# Patient Record
Sex: FEMALE | Race: WHITE | Employment: OTHER
[De-identification: names, ages, dates, MRNs, and addresses within clinical notes are randomized per-mention and may not be internally consistent; named-entity substitution may affect disease eponyms.]

---

## 2017-06-24 ENCOUNTER — HEALTH MAINTENANCE LETTER (OUTPATIENT)
Age: 72
End: 2017-06-24

## 2018-01-20 ENCOUNTER — HEALTH MAINTENANCE LETTER (OUTPATIENT)
Age: 73
End: 2018-01-20

## 2018-06-30 ENCOUNTER — HEALTH MAINTENANCE LETTER (OUTPATIENT)
Age: 73
End: 2018-06-30

## 2019-10-03 ENCOUNTER — HEALTH MAINTENANCE LETTER (OUTPATIENT)
Age: 74
End: 2019-10-03

## 2019-12-16 ENCOUNTER — HEALTH MAINTENANCE LETTER (OUTPATIENT)
Age: 74
End: 2019-12-16

## 2021-01-15 ENCOUNTER — HEALTH MAINTENANCE LETTER (OUTPATIENT)
Age: 76
End: 2021-01-15

## 2021-09-05 ENCOUNTER — HEALTH MAINTENANCE LETTER (OUTPATIENT)
Age: 76
End: 2021-09-05

## 2022-01-20 ENCOUNTER — OFFICE VISIT (OUTPATIENT)
Dept: FAMILY MEDICINE | Facility: CLINIC | Age: 77
End: 2022-01-20
Payer: COMMERCIAL

## 2022-01-20 VITALS — SYSTOLIC BLOOD PRESSURE: 106 MMHG | DIASTOLIC BLOOD PRESSURE: 60 MMHG

## 2022-01-20 DIAGNOSIS — L57.8 SOLAR ELASTOSIS: Primary | ICD-10-CM

## 2022-01-20 DIAGNOSIS — Z85.828 HISTORY OF NONMELANOMA SKIN CANCER: ICD-10-CM

## 2022-01-20 DIAGNOSIS — Z80.8 FAMILY HISTORY OF SKIN CANCER: ICD-10-CM

## 2022-01-20 DIAGNOSIS — L57.0 ACTINIC KERATOSES: ICD-10-CM

## 2022-01-20 DIAGNOSIS — D22.9 MULTIPLE BENIGN NEVI: ICD-10-CM

## 2022-01-20 DIAGNOSIS — L81.4 LENTIGINES: ICD-10-CM

## 2022-01-20 DIAGNOSIS — L82.1 SEBORRHEIC KERATOSES: ICD-10-CM

## 2022-01-20 DIAGNOSIS — D18.01 CHERRY ANGIOMA: ICD-10-CM

## 2022-01-20 PROCEDURE — 17004 DESTROY PREMAL LESIONS 15/>: CPT | Performed by: PHYSICIAN ASSISTANT

## 2022-01-20 PROCEDURE — 99203 OFFICE O/P NEW LOW 30 MIN: CPT | Mod: 25 | Performed by: PHYSICIAN ASSISTANT

## 2022-01-20 RX ORDER — PANTOPRAZOLE SODIUM 40 MG/1
40 TABLET, DELAYED RELEASE ORAL
COMMUNITY
Start: 2022-01-11

## 2022-01-20 RX ORDER — ATORVASTATIN CALCIUM 80 MG/1
80 TABLET, FILM COATED ORAL
COMMUNITY
Start: 2022-01-18

## 2022-01-20 RX ORDER — AMOXICILLIN 500 MG/1
TABLET, FILM COATED ORAL
COMMUNITY
Start: 2021-11-24

## 2022-01-20 RX ORDER — NITROGLYCERIN 0.4 MG/1
0.4 TABLET SUBLINGUAL
COMMUNITY
Start: 2022-01-14

## 2022-01-20 RX ORDER — ACETAMINOPHEN 500 MG
1000 TABLET ORAL
COMMUNITY
Start: 2021-10-12

## 2022-01-20 NOTE — PROGRESS NOTES
[facility] Dermatology Note  Encounter Date: Jan 20, 2022  Office Visit   ____________________________________________    Assessment & Plan:    1. Actinic keratoses x 15 and solar elastosis  Due to chronic sun exposure.  - LN2 for 5 seconds x 2. Discussed AE include hypopigmentation (white spot) and recurrence. Follow up in 2-3 months to recheck lesions. There is a risk of AKs developing into a SCC.   Treatment options include LN2 vs PDT vs Efudex. Pt elected LN2    2. Lentigines, multiple benign nevi, cherry angiomas, and seborrheic keratoses  Treatment of these lesions would be purely cosmetic and not medically necessary.  Lesion may recur and/or may not completely resolve. May need additional treatment.  Different removal options including excision, cryotherapy, cautery and /or laser.      3. History of NMSC - SCC, right shoulder 2014, family history of skin cancer  No evidence of recurrence  Signs and Symptoms of non-melanoma skin cancer and ABCDEs of melanoma reviewed with patient. Patient encouraged to perform monthly self skin exams and educated on how to perform them. UV precautions reviewed with patient. Patient was asked about new or changing moles/lesions on body.   Wear a sunscreen with at least SPF 30 on your face, ears, neck and V of the chest daily. Wear sunscreen on other areas of the body if those areas are exposed to the sun throughout the day. Sunscreens can contain physical and/or chemical blockers. Physical blockers are less likely to clog pores, these include zinc oxide and titanium dioxide. Reapply every two hour and after swimming. Sunscreen examples include Neutrogena, CeraVe, Blue Lizard, Elta MD and many others.        Follow-up: 1 year(s) in-person, or earlier for new or changing lesions    Labs and office visit notes reviewed:  1/18/22 hospital visit    Provider Disclosure:   The documentation recorded by the scribe accurately reflects the services I personally  performed and the decisions made by me.    Henrietta Nova, MS, NADIA      Scribe Disclosure:  I, Cristal Michael, am serving as a scribe to document services personally performed by Henrietta Nova PA-C based on data collection and the provider's statements to me.   ____________________________________________________    HPI:  Ms. Helga Guevara is a(n) 76 year old female who presents today as a new patient for FBSE, making note of some scaly spots on the face. She states these have been present for several weeks to months. Patient reports the following symptoms: none. Patient reports the following previous treatments: none. Patient reports the following modifying factors: none. Associated symptoms: none. Patient has no other skin complaints today. Remainder of the HPI, Meds, PMH, Allergies, FH, and SH was reviewed in chart.       Pertinent findings: personal history of NMSC - SCC right shoulder 2014; family history of skin cancer - grandmother    Medications:  Current Outpatient Medications   Medication     acetaminophen (TYLENOL) 500 MG tablet     amoxicillin (AMOXIL) 500 MG tablet     ASPIRIN 81 MG OR TABS     atorvastatin (LIPITOR) 80 MG tablet     FISH  MG OR CAPS     MULTIVITAMINS OR TABS     nitroGLYcerin (NITROSTAT) 0.4 MG sublingual tablet     pantoprazole (PROTONIX) 40 MG EC tablet     ALBUTEROL SULFATE 108 MCG/ACT IN AERS     NASONEX 50 MCG/ACT NA SUSP     PRAVASTATIN SODIUM 40 MG OR TABS     No current facility-administered medications for this visit.        Past Medical History:   Patient Active Problem List   Diagnosis     Symptomatic menopausal or female climacteric states     Chronic rhinitis     Pure hypercholesterolemia     Past Medical History:   Diagnosis Date     Symptomatic menopausal or female climacteric states last menses ? 2000       Past Surgical History:   Past Surgical History:   Procedure Laterality Date     SALPINGO OOPHORECTOMY,R/L/ZOILA      Oophorectomy, one side- pt  doesn't know which. secondary to large benign cyst      TUBAL LIGATION         Family History:  Family History   Problem Relation Age of Onset     Neurologic Disorder Father         multiple sclerosis      Diabetes Mother         type II      C.A.D. Mother         s/p stents x 2      Hypertension Mother      Hypertension Maternal Grandfather      Circulatory Paternal Grandfather          of ruptured triple A        Social History:  Social History     Tobacco Use     Smoking status: Former Smoker     Packs/day: 0.10     Years: 5.00     Pack years: 0.50     Types: Cigarettes     Quit date: 1982     Years since quittin.0     Smokeless tobacco: Never Used   Substance Use Topics     Alcohol use: Yes     Comment: very occasionally 1 drink every month           Review Of Systems:  Skin: scaly spots on face  Eyes: negative  Ears/Nose/Throat: negative  Respiratory: No shortness of breath, dyspnea on exertion, cough, or hemoptysis  Cardiovascular: negative  Gastrointestinal: negative  Genitourinary: negative  Musculoskeletal: negative  Neurologic: negative  Psychiatric: negative  Hematologic/Lymphatic/Immunologic: negative  Endocrine: negative      Objective:     /60   Eyes: Conjunctivae/lids: Normal   ENT: Lips:  Normal  MSK: Normal  Cardiovascular: Peripheral edema none  Pulm: Breathing Normal  Neuro/Psych: Orientation: Normal; Mood/Affect: Normal, NAD, WDWN  Pt accompanied by: self  Following areas examined: Scalp, face, eyelids, lips, neck, chest, abdomen, back, buttocks, and R&L upper and lower extremities. Pt defers exam of groin and genitals.   Pack skin type: I   Findings:  Pink gritty macules on the left cheek x1, central forehead x2, nose x4, left dorsal hand x3, left shoulder x3, left clavicle x2  Rhytides, hypo/hyperpigmentation, and atrophy  Red smooth well-defined macules on trunk and extremities.  Brown, stuck-on scaly appearing papules on trunk and extremities.  Well circumscribed  macules with symmetric color distribution on trunk and extremities.  Tan WD smooth macules on face, neck, trunk, and extremities.  Well healed scar at site of prior skin cancer removal.         CC No referring provider defined for this encounter. on close of this encounter.

## 2022-01-20 NOTE — LETTER
1/20/2022         RE: Helga Guevara  [address] [address]        Dear Colleague,    Thank you for referring your patient, Helga Guevara, to the [facility] [place]. Please see a copy of my visit note below.    [facility] Dermatology Note  Encounter Date: Jan 20, 2022  Office Visit   ____________________________________________    Assessment & Plan:    1. Actinic keratoses x 15 and solar elastosis  Due to chronic sun exposure.  - LN2 for 5 seconds x 2. Discussed AE include hypopigmentation (white spot) and recurrence. Follow up in 2-3 months to recheck lesions. There is a risk of AKs developing into a SCC.   Treatment options include LN2 vs PDT vs Efudex. Pt elected LN2    2. Lentigines, multiple benign nevi, cherry angiomas, and seborrheic keratoses  Treatment of these lesions would be purely cosmetic and not medically necessary.  Lesion may recur and/or may not completely resolve. May need additional treatment.  Different removal options including excision, cryotherapy, cautery and /or laser.      3. History of NMSC - SCC, right shoulder 2014, family history of skin cancer  No evidence of recurrence  Signs and Symptoms of non-melanoma skin cancer and ABCDEs of melanoma reviewed with patient. Patient encouraged to perform monthly self skin exams and educated on how to perform them. UV precautions reviewed with patient. Patient was asked about new or changing moles/lesions on body.   Wear a sunscreen with at least SPF 30 on your face, ears, neck and V of the chest daily. Wear sunscreen on other areas of the body if those areas are exposed to the sun throughout the day. Sunscreens can contain physical and/or chemical blockers. Physical blockers are less likely to clog pores, these include zinc oxide and titanium dioxide. Reapply every two hour and after swimming. Sunscreen examples include Neutrogena, CeraVe, Blue Lizard, Elta MD and many others.         Follow-up: 1 year(s) in-person, or earlier for new or changing lesions    Labs and office visit notes reviewed:  1/18/22 hospital visit    Provider Disclosure:   The documentation recorded by the scribe accurately reflects the services I personally performed and the decisions made by me.    Henrietta Nova, MS, NADIA      Scribe Disclosure:  I, Cristal Michael, am serving as a scribe to document services personally performed by Henrietta Nova PA-C based on data collection and the provider's statements to me.   ____________________________________________________    HPI:  Ms. Helga Guevara is a(n) 76 year old female who presents today as a new patient for FBSE, making note of some scaly spots on the face. She states these have been present for several weeks to months. Patient reports the following symptoms: none. Patient reports the following previous treatments: none. Patient reports the following modifying factors: none. Associated symptoms: none. Patient has no other skin complaints today. Remainder of the HPI, Meds, PMH, Allergies, FH, and SH was reviewed in chart.       Pertinent findings: personal history of NMSC - SCC right shoulder 2014; family history of skin cancer - grandmother    Medications:  Current Outpatient Medications   Medication     acetaminophen (TYLENOL) 500 MG tablet     amoxicillin (AMOXIL) 500 MG tablet     ASPIRIN 81 MG OR TABS     atorvastatin (LIPITOR) 80 MG tablet     FISH  MG OR CAPS     MULTIVITAMINS OR TABS     nitroGLYcerin (NITROSTAT) 0.4 MG sublingual tablet     pantoprazole (PROTONIX) 40 MG EC tablet     ALBUTEROL SULFATE 108 MCG/ACT IN AERS     NASONEX 50 MCG/ACT NA SUSP     PRAVASTATIN SODIUM 40 MG OR TABS     No current facility-administered medications for this visit.        Past Medical History:   Patient Active Problem List   Diagnosis     Symptomatic menopausal or female climacteric states     Chronic rhinitis     Pure hypercholesterolemia     Past Medical  History:   Diagnosis Date     Symptomatic menopausal or female climacteric states last menses ?        Past Surgical History:   Past Surgical History:   Procedure Laterality Date     SALPINGO OOPHORECTOMY,R/L/ZOILA      Oophorectomy, one side- pt doesn't know which. secondary to large benign cyst      TUBAL LIGATION         Family History:  Family History   Problem Relation Age of Onset     Neurologic Disorder Father         multiple sclerosis      Diabetes Mother         type II      C.A.D. Mother         s/p stents x 2      Hypertension Mother      Hypertension Maternal Grandfather      Circulatory Paternal Grandfather          of ruptured triple A        Social History:  Social History     Tobacco Use     Smoking status: Former Smoker     Packs/day: 0.10     Years: 5.00     Pack years: 0.50     Types: Cigarettes     Quit date: 1982     Years since quittin.0     Smokeless tobacco: Never Used   Substance Use Topics     Alcohol use: Yes     Comment: very occasionally 1 drink every month           Review Of Systems:  Skin: scaly spots on face  Eyes: negative  Ears/Nose/Throat: negative  Respiratory: No shortness of breath, dyspnea on exertion, cough, or hemoptysis  Cardiovascular: negative  Gastrointestinal: negative  Genitourinary: negative  Musculoskeletal: negative  Neurologic: negative  Psychiatric: negative  Hematologic/Lymphatic/Immunologic: negative  Endocrine: negative      Objective:     /60   Eyes: Conjunctivae/lids: Normal   ENT: Lips:  Normal  MSK: Normal  Cardiovascular: Peripheral edema none  Pulm: Breathing Normal  Neuro/Psych: Orientation: Normal; Mood/Affect: Normal, NAD, WDWN  Pt accompanied by: self  Following areas examined: Scalp, face, eyelids, lips, neck, chest, abdomen, back, buttocks, and R&L upper and lower extremities. Pt defers exam of groin and genitals.   Pack skin type: I   Findings:  Pink gritty macules on the left cheek x1, central forehead x2, nose x4,  left dorsal hand x3, left shoulder x3, left clavicle x2  Rhytides, hypo/hyperpigmentation, and atrophy  Red smooth well-defined macules on trunk and extremities.  Brown, stuck-on scaly appearing papules on trunk and extremities.  Well circumscribed macules with symmetric color distribution on trunk and extremities.  Tan WD smooth macules on face, neck, trunk, and extremities.  Well healed scar at site of prior skin cancer removal.         CC No referring provider defined for this encounter. on close of this encounter.      Again, thank you for allowing me to participate in the care of your patient.        Sincerely,        Henrietta Nova PA-C

## 2022-01-20 NOTE — PATIENT INSTRUCTIONS
Proper skin care from [facility]:    -Eliminate harsh soaps as they strip the natural oils from the skin, often resulting in dry itchy skin ( i.e. Dial, Zest, Gretel Spring)  -Use mild soaps such as Cetaphil or Dove Sensitive Skin in the shower. You do not need to use soap on arms, legs, and trunk every time you shower unless visibly soiled.   -Avoid hot or cold showers.  -After showering, lightly dry off and apply moisturizing within 2-3 minutes. This will help trap moisture in the skin.   -Aggressive use of a moisturizer at least 1-2 times a day to the entire body (including -Vanicream, Cetaphil, Aquaphor or Cerave) and moisturize hands after every washing.  -We recommend using moisturizers that come in a tub that needs to be scooped out, not a pump. This has more of an oil base. It will hold moisture in your skin much better than a water base moisturizer. The above recommended are non-pore clogging.      Wear a sunscreen with at least SPF 30 on your face, ears, neck and V of the chest daily. Wear sunscreen on other areas of the body if those areas are exposed to the sun throughout the day. Sunscreens can contain physical and/or chemical blockers. Physical blockers are less likely to clog pores, these include zinc oxide and titanium dioxide. Reapply every two hour and after swimming.     Sunscreen examples: https://www.ewg.org/sunscreen/    UV radiation  UVA radiation remains constant throughout the day and throughout the year. It is a longer wavelength than UVB and therefore penetrates deeper into the skin leading to immediate and delayed tanning, photoaging, and skin cancer. 70-80% of UVA and UVB radiation occurs between the hours of 10am-2pm.  UVB radiation  UVB radiation causes the most harmful effects and is more significant during the summer months. However, snow and ice can reflect UVB radiation leading to skin damage during the winter months as well. UVB radiation is responsible for tanning,  burning, inflammation, delayed erythema (pinkness), pigmentation (brown spots), and skin cancer.     I recommend self monthly full body exams and yearly full body exams with a dermatology provider. If you develop a new or changing lesion please follow up for examination. Most skin cancers are pink and scaly or pink and pearly. However, we do see blue/brown/black skin cancers.  Consider the ABCDEs of melanoma when giving yourself your monthly full body exam ( don't forget the groin, buttocks, feet, toes, etc). A-asymmetry, B-borders, C-color, D-diameter, E-elevation or evolving. If you see any of these changes please follow up in clinic. If you cannot see your back I recommend purchasing a hand held mirror to use with a larger wall mirror.          WOUND CARE INSTRUCTIONS  FOR CRYOSURGERY  For questions please call [phone]        This area treated with liquid nitrogen will form a blister. You do not need to bandage the area until after the blister forms and breaks (which may be a few days).  When the blister breaks, begin daily dressing changes as follows:    1) Clean and dry the area with tap water using clean Q-tip or sterile gauze pad.    2) Apply Vaseline or Aquaphor over entire wound. Other options include Polysporin ointment or Bacitracin ointment over entire wound.  Do NOT use Neosporin ointment.    3) Cover the wound with a band-aid or sterile non-stick gauze pad and micropore paper tape.      REPEAT THESE INSTRUCTIONS AT LEAST ONCE A DAY UNTIL THE WOUND HAS COMPLETELY HEALED.        It is an old wives tale that a wound heals better when it is exposed to air and allowed to dry out. The wound will heal faster with a better cosmetic result if it is kept moist with ointment and covered with a bandage.  Do not let the wound dry out.      Supplies Needed:     *Cotton tipped applicators (Q-tips)   *Polysporin ointment or Bacitracin ointment (NOT NEOSPORIN)   *Band-aids, or non stick gauze pads and micropore  paper tape    PATIENT INFORMATION    During the healing process you will notice a number of changes. All wounds develop a small halo of redness surrounding the wound.  This means healing is occurring. Severe itching with extensive redness usually indicates sensitivity to the ointment or bandage tape used to dress the wound.  You should call our office if this develops.      Swelling and/or discoloration around your surgical site is common, particularly when performed around the eye.    All wounds normally drain.  The larger the wound the more drainage there will be.  After 7-10 days, you will notice the wound beginning to shrink and new skin will begin to grow.  The wound is healed when you can see skin has formed over the entire area.  A healed wound has a healthy, shiny look to the surface and is red to dark pink in color to normalize.  Wounds may take approximately 4-6 weeks to heal.  Larger wounds may take 6-8 weeks.  After the wound is healed you may discontinue dressing changes.    You may experience a sensation of tightness as your wound heals. This is normal and will gradually subside.    Your healed wound may be sensitive to temperature changes. This sensitivity improves with time, but if you re having a lot of discomfort, try to avoid temperature extremes.    Patients frequently experience itching after their wound appears to have healed because of the continue healing under the skin.  Plain Vaseline will help relieve the itching.

## 2022-02-20 ENCOUNTER — HEALTH MAINTENANCE LETTER (OUTPATIENT)
Age: 77
End: 2022-02-20

## 2022-04-25 ENCOUNTER — OFFICE VISIT (OUTPATIENT)
Dept: FAMILY MEDICINE | Facility: CLINIC | Age: 77
End: 2022-04-25
Payer: COMMERCIAL

## 2022-04-25 VITALS — SYSTOLIC BLOOD PRESSURE: 128 MMHG | DIASTOLIC BLOOD PRESSURE: 52 MMHG

## 2022-04-25 DIAGNOSIS — L57.0 ACTINIC KERATOSIS: Primary | ICD-10-CM

## 2022-04-25 PROCEDURE — 99213 OFFICE O/P EST LOW 20 MIN: CPT | Performed by: PHYSICIAN ASSISTANT

## 2022-04-25 RX ORDER — FLUOROURACIL 50 MG/G
CREAM TOPICAL 2 TIMES DAILY
Qty: 40 G | Refills: 0 | Status: SHIPPED | OUTPATIENT
Start: 2022-04-25 | End: 2022-04-25

## 2022-04-25 RX ORDER — FLUOROURACIL 50 MG/G
CREAM TOPICAL 2 TIMES DAILY
Qty: 40 G | Refills: 0 | Status: SHIPPED | OUTPATIENT
Start: 2022-04-25

## 2022-04-25 NOTE — LETTER
4/25/2022         RE: Helga Guevara  [address] [address]        Dear Colleague,    Thank you for referring your patient, Helga Guevara, to the [facility] [place]. Please see a copy of my visit note below.    [facility] Dermatology Note  Encounter Date: Apr 25, 2022  Office Visit     Dermatology Problem List:  1. History of NMSC - SCC, right shoulder 2014  2. AK s/p cryo  ____________________________________________    Assessment & Plan:    # Actinic keratosis.   - Start Efudex BID for 2-4 weeks or until the onset of irritation. Anticipated reaction discussed.. Recommend washing hands after use and/or using gloves to apply, keeping medication away from pets, and avoiding sun exposure to treated area.    - Recheck in 3mo     Procedures Performed:   none    Follow-up: 3 month(s) in-person, or earlier for new or changing lesions    Staff and Scribe:     Scribe Disclosure:  I, Mery Malik, am serving as a scribe to prep the notes for Ruchi Aguilar PA-C .    Provider Disclosure:   The documentation recorded by the scribe accurately reflects the services I personally performed and the decisions made by me.    All risks, benefits and alternatives were discussed with patient.  Patient is in agreement and understands the assessment and plan.  All questions were answered.    Ruchi Aguilar PA-C, [facility]  [facility] [place]: Phone: [phone], Fax: [phone]  [facility]: Phone: [phone],  Fax: [phone]  [facility] [place]: Phone: [phone], Fax: [phone]  ____________________________________    CC: No chief complaint on file.    HPI:  Ms. Helga Guevara is a(n) 76 year old female who presents today as a new patient for a skin exam. Last seen in dermatology by Kia Nova PA-C on 1/20/22 at which point 15 Aks were treated with cryo. Notes  some of the spots have not resolved. Had FBSE this past January.    Patient is otherwise feeling well, without additional skin concerns.    Labs Reviewed:  none    Physical Exam:  Vitals: There were no vitals taken for this visit.  SKIN: Focused examination of head and neck was performed.  - There are erythematous macules with overyling adherent scale on the face.  - No other lesions of concern on areas examined.     Medications:  Current Outpatient Medications   Medication     acetaminophen (TYLENOL) 500 MG tablet     ALBUTEROL SULFATE 108 MCG/ACT IN AERS     amoxicillin (AMOXIL) 500 MG tablet     ASPIRIN 81 MG OR TABS     atorvastatin (LIPITOR) 80 MG tablet     FISH  MG OR CAPS     MULTIVITAMINS OR TABS     NASONEX 50 MCG/ACT NA SUSP     nitroGLYcerin (NITROSTAT) 0.4 MG sublingual tablet     pantoprazole (PROTONIX) 40 MG EC tablet     PRAVASTATIN SODIUM 40 MG OR TABS     No current facility-administered medications for this visit.      Past Medical History:   Patient Active Problem List   Diagnosis     Symptomatic menopausal or female climacteric states     Chronic rhinitis     Pure hypercholesterolemia     Past Medical History:   Diagnosis Date     Symptomatic menopausal or female climacteric states last menses ? 2000             Again, thank you for allowing me to participate in the care of your patient.        Sincerely,        Ruchi Aguilar PA-C

## 2022-04-25 NOTE — PROGRESS NOTES
[facility] Dermatology Note  Encounter Date: Apr 25, 2022  Office Visit     Dermatology Problem List:  1. History of NMSC - SCC, right shoulder 2014  2. AK s/p cryo  ____________________________________________    Assessment & Plan:    # Actinic keratosis.   - Start Efudex BID for 2-4 weeks or until the onset of irritation. Anticipated reaction discussed.. Recommend washing hands after use and/or using gloves to apply, keeping medication away from pets, and avoiding sun exposure to treated area.    - Recheck in 3mo     Procedures Performed:   none    Follow-up: 3 month(s) in-person, or earlier for new or changing lesions    Staff and Scribe:     Scribe Disclosure:  I, Mery Malik, am serving as a scribe to prep the notes for Ruchi Aguilar PA-C .    Provider Disclosure:   The documentation recorded by the scribe accurately reflects the services I personally performed and the decisions made by me.    All risks, benefits and alternatives were discussed with patient.  Patient is in agreement and understands the assessment and plan.  All questions were answered.    Ruchi Aguilar PA-C, MPAS  [facility] [place]: Phone: [phone], Fax: [phone]  [facility]: Phone: [phone],  Fax: [phone]  [facility]: Phone: [phone], Fax: [phone]  ____________________________________    CC: No chief complaint on file.    HPI:  Ms. Helga Guevara is a(n) 76 year old female who presents today as a new patient for a skin exam. Last seen in dermatology by Kia Nova PA-C on 1/20/22 at which point 15 Aks were treated with cryo. Notes some of the spots have not resolved. Had FBSE this past January.    Patient is otherwise feeling well, without additional skin concerns.    Labs Reviewed:  none    Physical Exam:  Vitals: There were no vitals taken for this visit.  SKIN: Focused examination  of head and neck was performed.  - There are erythematous macules with overyling adherent scale on the face.  - No other lesions of concern on areas examined.     Medications:  Current Outpatient Medications   Medication     acetaminophen (TYLENOL) 500 MG tablet     ALBUTEROL SULFATE 108 MCG/ACT IN AERS     amoxicillin (AMOXIL) 500 MG tablet     ASPIRIN 81 MG OR TABS     atorvastatin (LIPITOR) 80 MG tablet     FISH  MG OR CAPS     MULTIVITAMINS OR TABS     NASONEX 50 MCG/ACT NA SUSP     nitroGLYcerin (NITROSTAT) 0.4 MG sublingual tablet     pantoprazole (PROTONIX) 40 MG EC tablet     PRAVASTATIN SODIUM 40 MG OR TABS     No current facility-administered medications for this visit.      Past Medical History:   Patient Active Problem List   Diagnosis     Symptomatic menopausal or female climacteric states     Chronic rhinitis     Pure hypercholesterolemia     Past Medical History:   Diagnosis Date     Symptomatic menopausal or female climacteric states last menses ? 2000

## 2022-10-23 ENCOUNTER — HEALTH MAINTENANCE LETTER (OUTPATIENT)
Age: 77
End: 2022-10-23

## 2023-04-02 ENCOUNTER — HEALTH MAINTENANCE LETTER (OUTPATIENT)
Age: 78
End: 2023-04-02

## 2024-06-02 ENCOUNTER — HEALTH MAINTENANCE LETTER (OUTPATIENT)
Age: 79
End: 2024-06-02

## 2025-03-23 ENCOUNTER — HEALTH MAINTENANCE LETTER (OUTPATIENT)
Age: 80
End: 2025-03-23